# Patient Record
Sex: MALE | URBAN - METROPOLITAN AREA
[De-identification: names, ages, dates, MRNs, and addresses within clinical notes are randomized per-mention and may not be internally consistent; named-entity substitution may affect disease eponyms.]

---

## 2023-10-23 ENCOUNTER — TELEPHONE (OUTPATIENT)
Age: 73
End: 2023-10-23

## 2023-10-23 NOTE — TELEPHONE ENCOUNTER
Patient's wife called back stating she got a call from the office and she missed it. Informed her that no sooner appointment at this time. The office will await the records to be reviewed and see if sooner appointment is necessary.   She verbalized understanding and will have more medical records sent from [facility].     Patient can be reached at [phone]

## 2023-10-23 NOTE — TELEPHONE ENCOUNTER
New Patient    What is the reason for the patient’s appointment?: Issues with urination, bladder pain, testicular cyst     Patient was being seen by [facility] but would like to get a second opinion. What office location does the patient prefer?: [place]     If No, are the records showing in Epic: Patients wife will be having records faxed over to our office. Fax number was given. HISTORY:   Has the patient had any previous Urologist(s)?: [facility]     Patient is now scheduled on 11/24 with  but patients wife states she would like patient to be seen sooner. Patients pcp and urologist are sending in records for him. Please monitor for records and call patients wife back regarding sooner apt.      CB: [phone]

## 2023-10-23 NOTE — TELEPHONE ENCOUNTER
Tried calling patient to inform them we do not have any openings in [place] before November 14th unless we get a cancellation.

## 2023-11-04 PROBLEM — N44.2 TESTICULAR CYST: Status: ACTIVE | Noted: 2023-11-04

## 2023-11-04 PROBLEM — N50.82 SCROTAL PAIN: Status: ACTIVE | Noted: 2023-11-04

## 2023-11-04 PROBLEM — Z80.42 FAMILY HISTORY OF PROSTATE CANCER: Status: ACTIVE | Noted: 2023-11-04

## 2023-11-04 PROBLEM — N40.1 BENIGN PROSTATIC HYPERPLASIA WITH LOWER URINARY TRACT SYMPTOMS: Status: ACTIVE | Noted: 2023-11-04

## 2023-11-04 PROBLEM — N39.8 VOIDING DYSFUNCTION: Status: ACTIVE | Noted: 2023-11-04

## 2023-11-04 PROBLEM — M62.89 PELVIC FLOOR DYSFUNCTION: Status: ACTIVE | Noted: 2023-11-04

## 2023-11-05 NOTE — PROGRESS NOTES
UROLOGY PROGRESS NOTE         NAME: Shorty Malloy  AGE: 72 y.o. SEX: male  : 1950   MRN: [id number]    DATE: 2023  TIME: 9:07 AM    Assessment and Plan   Procedures     Impression:   1. Suprapubic pain    2. Voiding dysfunction    3. Testicular cyst    4. Pelvic floor dysfunction    5. Benign prostatic hyperplasia with lower urinary tract symptoms, symptom details unspecified    6. Scrotal pain    7. Family history of prostate cancer    8. Hydrocele, unspecified hydrocele type    9. Scrotal mass         Plan: Suspect this is neuropathic pain from probably some stretch nerve. He notes looking at his history this might have occurred during his physical therapy rehabilitation on his right knee. I do not disagree with the Abida mai about the consideration of pelvic floor relaxation therapy with the physical therapist.  However another option would be trying Elavil for the neuropathic pain to 25 mg dose. Patient elects to try Elavil 25 mg. I told him to check with her family doctor if it is okay to take with Prozac. From my standpoint it is. Please note his wife does not want him to take NSAIDs because of his history of GI upset complaints. Reevaluate in 3 months. If he is doing well we will get him on a weaning protocol or stop the medication would be an option. Told the patient if he is not feeling better after 8 weeks feel free to call      Chief Complaint   No chief complaint on file. History of Present Illness     HPI: Shorty Malloy is a 72y.o. year old male who presents with had scrotal ultrasound that showed bilateral complex hydroceles. Chronic right epididymitis possibly and mildly complex bilateral epididymal head cyst.  Also had a small cyst in the right testicle  Patient has been seen by [facility] urology there was a note recently in October that showed scrotal pain bilateral family history of prostate cancer and pelvic floor dysfunction.   He was recommended either physical therapy or Flomax he decided on Flomax. He had a long history epididymitis and orchitis and multiple ultrasounds    Currently had a PSA 2.1 in 2023. Patient here for second opinion regarding bilateral groin pain. Patient states after he had his knee operation in September he was going through physical therapy and developed this groin discomfort as well as orchialgia. He does a have occasional dysuria but no real bothersome irritable or obstructive voiding complaints and was not interested in taking Flomax at the [facility]. He states that large 8 cm complex cyst by renal ultrasound has been there for years and that is not bothersome to him. Interestingly he says the discomfort comes and goes in the groin and the PNO scrotal area. It is worse with anxiety he was recently states that Ativan helps. He also was recently started on Prozac. He also said a heating pad helps. The following portions of the patient's history were reviewed and updated as appropriate: allergies, current medications, past family history, past medical history, past social history, past surgical history and problem list.  Past Medical History:   Diagnosis Date    Anxiety     Arthritis     BPH (benign prostatic hyperplasia)     Left ankle pain     Low back pain     Pancreatic cyst     Prostate cancer screening     Pulmonary nodule     Rheumatoid arthritis ([address])      Past Surgical History:   Procedure Laterality Date    COLONOSCOPY      REPLACEMENT TOTAL KNEE       shoulder  Review of Systems     Const: Denies chills, fever and weight loss. CV: Denies chest pain. Resp: Denies SOB. GI: Denies abdominal pain, nausea and vomiting. : Denies symptoms other than stated above. Musculo: Denies back pain. Objective   There were no vitals taken for this visit. Physical Exam  Const: Appears healthy and well developed. No signs of acute distress present.   Resp: Respirations are regular and unlabored. CV: Rate is regular. Rhythm is regular. Abdomen: Abdomen is soft, nontender, and nondistended. Kidneys are not palpable. : Normal external genitalia exam except for the fact that he had that large complex cyst in the superior aspect the right hemiscrotum. Both testicles were a little tender to the touch. There were no groin hernias but he was sore in both of the inguinal area. The prostate was benign feeling and smooth slightly enlarged. Psych: Patient's attitude is cooperative. Mood is normal. Affect is normal.    Current Medications   No current outpatient medications on file.         Annie Sales MD

## 2023-11-07 PROBLEM — N43.3 HYDROCELE: Status: ACTIVE | Noted: 2023-11-07

## 2023-11-07 PROBLEM — N50.89 SCROTAL MASS: Status: ACTIVE | Noted: 2023-11-07

## 2023-11-08 ENCOUNTER — OFFICE VISIT (OUTPATIENT)
Dept: UROLOGY | Facility: CLINIC | Age: 73
End: 2023-11-08
Payer: MEDICARE

## 2023-11-08 VITALS
WEIGHT: 182.8 LBS | SYSTOLIC BLOOD PRESSURE: 140 MMHG | BODY MASS INDEX: 27.71 KG/M2 | TEMPERATURE: 98.5 F | HEART RATE: 74 BPM | DIASTOLIC BLOOD PRESSURE: 82 MMHG | HEIGHT: 68 IN | OXYGEN SATURATION: 98 %

## 2023-11-08 DIAGNOSIS — N44.2 TESTICULAR CYST: ICD-10-CM

## 2023-11-08 DIAGNOSIS — N40.1 BENIGN PROSTATIC HYPERPLASIA WITH LOWER URINARY TRACT SYMPTOMS, SYMPTOM DETAILS UNSPECIFIED: ICD-10-CM

## 2023-11-08 DIAGNOSIS — M62.89 PELVIC FLOOR DYSFUNCTION: ICD-10-CM

## 2023-11-08 DIAGNOSIS — N43.3 HYDROCELE, UNSPECIFIED HYDROCELE TYPE: ICD-10-CM

## 2023-11-08 DIAGNOSIS — N50.89 SCROTAL MASS: ICD-10-CM

## 2023-11-08 DIAGNOSIS — Z80.42 FAMILY HISTORY OF PROSTATE CANCER: ICD-10-CM

## 2023-11-08 DIAGNOSIS — N50.82 SCROTAL PAIN: ICD-10-CM

## 2023-11-08 DIAGNOSIS — R10.2 SUPRAPUBIC PAIN: Primary | ICD-10-CM

## 2023-11-08 DIAGNOSIS — N39.8 VOIDING DYSFUNCTION: ICD-10-CM

## 2023-11-08 LAB
SL AMB  POCT GLUCOSE, UA: ABNORMAL
SL AMB LEUKOCYTE ESTERASE,UA: ABNORMAL
SL AMB POCT BILIRUBIN,UA: ABNORMAL
SL AMB POCT BLOOD,UA: ABNORMAL
SL AMB POCT CLARITY,UA: CLEAR
SL AMB POCT COLOR,UA: ABNORMAL
SL AMB POCT KETONES,UA: ABNORMAL
SL AMB POCT NITRITE,UA: ABNORMAL
SL AMB POCT PH,UA: 6.5
SL AMB POCT SPECIFIC GRAVITY,UA: 1.02
SL AMB POCT URINE PROTEIN: ABNORMAL
SL AMB POCT UROBILINOGEN: 0.2

## 2023-11-08 PROCEDURE — 81003 URINALYSIS AUTO W/O SCOPE: CPT | Performed by: UROLOGY

## 2023-11-08 PROCEDURE — 99204 OFFICE O/P NEW MOD 45 MIN: CPT | Performed by: UROLOGY

## 2023-11-08 RX ORDER — OMEPRAZOLE 40 MG/1
40 CAPSULE, DELAYED RELEASE ORAL
COMMUNITY

## 2023-11-08 RX ORDER — AMITRIPTYLINE HYDROCHLORIDE 25 MG/1
25 TABLET, FILM COATED ORAL
Qty: 90 TABLET | Refills: 3 | Status: SHIPPED | OUTPATIENT
Start: 2023-11-08

## 2023-11-08 RX ORDER — FLUOXETINE HYDROCHLORIDE 20 MG/1
CAPSULE ORAL
COMMUNITY
Start: 2023-10-31

## 2023-11-08 RX ORDER — FLUTICASONE PROPIONATE 50 MCG
1 SPRAY, SUSPENSION (ML) NASAL
COMMUNITY

## 2023-11-08 RX ORDER — L. ACIDOPHILUS/L. BIFIDUS 1B CELL
WAFER ORAL
COMMUNITY

## 2023-11-08 RX ORDER — MELATONIN
4000 DAILY
COMMUNITY

## 2023-11-08 RX ORDER — METHOTREXATE 25 MG/ML
INJECTION, SOLUTION INTRA-ARTERIAL; INTRAMUSCULAR; INTRAVENOUS
COMMUNITY

## 2023-11-08 RX ORDER — UREA 10 %
800 LOTION (ML) TOPICAL
COMMUNITY

## 2023-11-08 RX ORDER — ACETAMINOPHEN 500 MG
1000 TABLET ORAL EVERY 6 HOURS PRN
COMMUNITY

## 2023-11-08 RX ORDER — MAGNESIUM OXIDE 400 MG/1
400 TABLET ORAL
COMMUNITY

## 2023-11-08 RX ORDER — INFLIXIMAB 100 MG/10ML
INJECTION, POWDER, LYOPHILIZED, FOR SOLUTION INTRAVENOUS
COMMUNITY

## 2023-11-08 RX ORDER — LORAZEPAM 0.5 MG/1
0.5 TABLET ORAL
COMMUNITY
Start: 2023-06-27

## 2023-11-08 RX ORDER — CETIRIZINE HYDROCHLORIDE 10 MG/1
10 TABLET ORAL DAILY
COMMUNITY

## 2024-03-28 ENCOUNTER — TELEPHONE (OUTPATIENT)
Dept: CARDIOLOGY CLINIC | Facility: CLINIC | Age: 74
End: 2024-03-28

## 2024-04-01 ENCOUNTER — TELEPHONE (OUTPATIENT)
Age: 74
End: 2024-04-01

## 2024-04-01 NOTE — TELEPHONE ENCOUNTER
Patient of DR. Colvin at [facility]    Patient's wife called stating she is to reschedule the appointment for 05/08/24. They were out of town and were not able to call back . She stated a message was left for 05/09/24 but appointments are no longer available.  Next available is not until September and patient would like to be seen prior to that.  Review and advise.    Patient can be reached at [phone]

## 2024-05-15 ENCOUNTER — TELEPHONE (OUTPATIENT)
Dept: UROLOGY | Facility: CLINIC | Age: 74
End: 2024-05-15

## 2024-05-15 DIAGNOSIS — N40.1 BENIGN PROSTATIC HYPERPLASIA WITH LOWER URINARY TRACT SYMPTOMS, SYMPTOM DETAILS UNSPECIFIED: Primary | ICD-10-CM

## 2024-05-15 PROBLEM — N50.812 PAIN IN BOTH TESTICLES: Status: ACTIVE | Noted: 2024-05-15

## 2024-05-15 PROBLEM — N50.811 PAIN IN BOTH TESTICLES: Status: ACTIVE | Noted: 2024-05-15

## 2024-05-15 NOTE — PROGRESS NOTES
"UROLOGY PROGRESS NOTE         NAME: Joshua Rosenthal  AGE: 74 y.o. SEX: male  : 1950   MRN: [id number]    DATE: 5/15/2024  TIME: 9:42 AM    Assessment and Plan   Procedures     Impression:   1. Pelvic floor dysfunction  2. Voiding dysfunction  3. Testicular cyst  4. Benign prostatic hyperplasia with lower urinary tract symptoms, symptom details unspecified  5. Hydrocele, unspecified hydrocele type  6. Family history of prostate cancer  7. Scrotal mass  8. Scrotal pain  9. Pain in both testicles       Plan: Patient is doing well with the Elavil for the bilateral orchialgia.  I recommend staying on Elavil for the next 6 months.  And then starting in December take it on Monday, Wednesday, and Friday for the next 2 months and if doing well stop the medication    Follow-up with me in 2025 for yearly CASEY PSA.      Chief Complaint   No chief complaint on file.    History of Present Illness     HPI: Joshua Rosenthal is a 74 y.o. year old male who presents with follow-up office visit from 2023.  Patient was having suprapubic pain, voiding dysfunction, pelvic floor dysfunction and he was going to consider physical therapy by [facility] but elected for Elavil 25 mg.    Also has a history of scrotal mass, hydrocele, family history of prostate cancer and testicular cyst.  We were to see the patient back in 3 months.    Please note, \"scrotal ultrasound 2023 right side testicle area showed a complex cyst measuring 8 cm that at the time of the office visit with me he was asymptomatic.  Also had a right testicular cyst, bilateral hydroceles, and epididymal head cyst and small varicocele.  Per [facility] radiology  Patient is here for follow-up bilateral orchialgia and response to Elavil.  Patient was to have a PSA.  Patient's PSA was 2.2 on 2024 this was discussed with he and his wife.    Currently feeling better with the Elavil for his orchialgia still has some times where he has some " discomfort but all in all happy with the results.      The following portions of the patient's history were reviewed and updated as appropriate: allergies, current medications, past family history, past medical history, past social history, past surgical history and problem list.  Past Medical History:   Diagnosis Date    Anxiety     Arthritis     BPH (benign prostatic hyperplasia)     Left ankle pain     Low back pain     Pancreatic cyst     Prostate cancer screening     Pulmonary nodule     Rheumatoid arthritis (HCC)      Past Surgical History:   Procedure Laterality Date    BASAL CELL CARCINOMA EXCISION      COLONOSCOPY      REPLACEMENT TOTAL KNEE       shoulder  Review of Systems     Const: Denies chills, fever and weight loss.  CV: Denies chest pain.  Resp: Denies SOB.  GI: Denies abdominal pain, nausea and vomiting.  : Denies symptoms other than stated above.  Musculo: Denies back pain.    Objective   There were no vitals taken for this visit.    Physical Exam  Const: Appears healthy and well developed. No signs of acute distress present.  Resp: Respirations are regular and unlabored.   CV: Rate is regular. Rhythm is regular.  Abdomen: Abdomen is soft, nontender, and nondistended. Kidneys are not palpable.  : He has right testicle nontender.  Left testicle slightly tender to touch no focal mass.  That right hemiscrotal cystic structure that it has been there for years and stable and in general not bothersome.  Psych: Patient's attitude is cooperative. Mood is normal. Affect is normal.    Current Medications     Current Outpatient Medications:     acetaminophen (TYLENOL) 500 mg tablet, Take 1,000 mg by mouth every 6 (six) hours as needed for mild pain, Disp: , Rfl:     amitriptyline (ELAVIL) 25 mg tablet, Take 1 tablet (25 mg total) by mouth daily at bedtime, Disp: 90 tablet, Rfl: 3    ascorbic acid (VITAMIN C) 1000 MG tablet, Take 1,000 mg by mouth daily, Disp: , Rfl:     cetirizine (ZyrTEC) 10 mg tablet,  Take 10 mg by mouth daily, Disp: , Rfl:     cholecalciferol (VITAMIN D3) 1,000 units tablet, Take 4,000 Units by mouth daily, Disp: , Rfl:     FLUoxetine (PROzac) 20 mg capsule, Take by mouth, Disp: , Rfl:     fluticasone (FLONASE) 50 mcg/act nasal spray, 1 spray into each nostril, Disp: , Rfl:     folic acid (FOLVITE) 800 MCG tablet, Take 800 mcg by mouth, Disp: , Rfl:     inFLIXimab (REMICADE) 100 mg, Inject into a catheter in a vein every 6 weeks, Disp: , Rfl:     Lactobacillus (Acidophilus/Bifidus) WAFR, Take by mouth, Disp: , Rfl:     LORazepam (Ativan) 0.5 mg tablet, 0.5 mg, Disp: , Rfl:     magnesium oxide (MAG-OX) 400 mg tablet, Take 400 mg by mouth, Disp: , Rfl:     methotrexate 50 MG/2ML injection, INJECT 0.8 ML EVERY WEEK DISCARD VIAL 28 DAYS FROM OPENING, Disp: , Rfl:     Multiple Vitamins-Minerals (PRESERVISION AREDS PO), Take by mouth, Disp: , Rfl:     omeprazole (PriLOSEC) 40 MG capsule, Take 40 mg by mouth, Disp: , Rfl:     Riboflavin (Vitamin B-2) 100 MG TABS, Take 400 mg by mouth, Disp: , Rfl:         Popeye Colvin MD

## 2024-05-15 NOTE — TELEPHONE ENCOUNTER
----- Message from Popeye Colvin MD sent at 5/15/2024  9:42 AM EDT -----  If possible, patient should have PSA a week prior to seeing me next week thanks

## 2024-05-15 NOTE — TELEPHONE ENCOUNTER
Spoke with pt and his wife, I will fax the psa order to PCP office. Confirmation received. Pt verbalized understanding.

## 2024-05-20 RX ORDER — PYRANTEL PAMOATE 50 MG/ML
SUSPENSION, ORAL (FINAL DOSE FORM) ORAL
COMMUNITY

## 2024-05-20 RX ORDER — THIAMINE HCL 100 MG
TABLET ORAL
COMMUNITY

## 2024-05-20 RX ORDER — CRANBERRY FRUIT EXTRACT 650 MG
CAPSULE ORAL
COMMUNITY
End: 2024-05-24 | Stop reason: ALTCHOICE

## 2024-05-20 RX ORDER — INFLIXIMAB 100 MG/10ML
INJECTION, POWDER, LYOPHILIZED, FOR SOLUTION INTRAVENOUS
COMMUNITY
End: 2024-05-24 | Stop reason: ALTCHOICE

## 2024-05-24 ENCOUNTER — OFFICE VISIT (OUTPATIENT)
Dept: UROLOGY | Facility: CLINIC | Age: 74
End: 2024-05-24
Payer: MEDICARE

## 2024-05-24 ENCOUNTER — TELEPHONE (OUTPATIENT)
Dept: UROLOGY | Facility: CLINIC | Age: 74
End: 2024-05-24

## 2024-05-24 VITALS
DIASTOLIC BLOOD PRESSURE: 92 MMHG | SYSTOLIC BLOOD PRESSURE: 158 MMHG | HEIGHT: 68 IN | BODY MASS INDEX: 27.68 KG/M2 | OXYGEN SATURATION: 97 % | TEMPERATURE: 97.6 F | WEIGHT: 182.6 LBS

## 2024-05-24 DIAGNOSIS — N50.811 PAIN IN BOTH TESTICLES: ICD-10-CM

## 2024-05-24 DIAGNOSIS — Z80.42 FAMILY HISTORY OF PROSTATE CANCER: ICD-10-CM

## 2024-05-24 DIAGNOSIS — N50.82 SCROTAL PAIN: ICD-10-CM

## 2024-05-24 DIAGNOSIS — N43.3 HYDROCELE, UNSPECIFIED HYDROCELE TYPE: ICD-10-CM

## 2024-05-24 DIAGNOSIS — N50.89 SCROTAL MASS: ICD-10-CM

## 2024-05-24 DIAGNOSIS — N44.2 TESTICULAR CYST: ICD-10-CM

## 2024-05-24 DIAGNOSIS — N40.1 BENIGN PROSTATIC HYPERPLASIA WITH LOWER URINARY TRACT SYMPTOMS, SYMPTOM DETAILS UNSPECIFIED: ICD-10-CM

## 2024-05-24 DIAGNOSIS — N50.812 PAIN IN BOTH TESTICLES: ICD-10-CM

## 2024-05-24 DIAGNOSIS — R35.1 NOCTURIA: ICD-10-CM

## 2024-05-24 DIAGNOSIS — M62.89 PELVIC FLOOR DYSFUNCTION: Primary | ICD-10-CM

## 2024-05-24 DIAGNOSIS — N39.8 VOIDING DYSFUNCTION: ICD-10-CM

## 2024-05-24 LAB
SL AMB  POCT GLUCOSE, UA: ABNORMAL
SL AMB LEUKOCYTE ESTERASE,UA: ABNORMAL
SL AMB POCT BILIRUBIN,UA: ABNORMAL
SL AMB POCT BLOOD,UA: ABNORMAL
SL AMB POCT CLARITY,UA: CLEAR
SL AMB POCT COLOR,UA: ABNORMAL
SL AMB POCT KETONES,UA: ABNORMAL
SL AMB POCT NITRITE,UA: ABNORMAL
SL AMB POCT PH,UA: 5.5
SL AMB POCT SPECIFIC GRAVITY,UA: 1.02
SL AMB POCT URINE PROTEIN: ABNORMAL
SL AMB POCT UROBILINOGEN: 0.2

## 2024-05-24 PROCEDURE — 81003 URINALYSIS AUTO W/O SCOPE: CPT | Performed by: UROLOGY

## 2024-05-24 PROCEDURE — 99214 OFFICE O/P EST MOD 30 MIN: CPT | Performed by: UROLOGY

## 2024-05-24 RX ORDER — AMOXICILLIN 500 MG/1
TABLET, FILM COATED ORAL
COMMUNITY
Start: 2024-04-26

## 2024-05-24 NOTE — TELEPHONE ENCOUNTER
----- Message from Popeye Colvin MD sent at 5/24/2024  9:37 AM EDT -----  Could you please let the wife know Yamilex, that my apologies I forgot to tell them about the Prelief on the discharge summary they can get that over-the-counter or on [employer] to help reduce the burning.  I recommend taking it once or twice a day.  Please let them know I apologize for forgetting to put that on the discharge summary thanks

## 2024-05-24 NOTE — TELEPHONE ENCOUNTER
Spoke with Yamilex, she is aware of Dr. Sage recommendations regarding prelief. I will mail them a list of foods to avoid and to eat to help decrease the burning and discomfort.  Info mailed as requested.

## 2024-05-24 NOTE — PATIENT INSTRUCTIONS
Please continue Elavil 25 mg at nighttime every day until December 1.  And then for the next 2 months please take the medication on Monday, Wednesday, Friday and do not take the medicine on Tuesday, Thursday, and weekends.  If you noticed that your symptoms are doing well whether you are on or off the medication then you can stop it.

## 2024-08-13 ENCOUNTER — TELEPHONE (OUTPATIENT)
Dept: UROLOGY | Facility: CLINIC | Age: 74
End: 2024-08-13

## 2024-08-13 NOTE — TELEPHONE ENCOUNTER
Left message for patient to call back and schedule a follow up appointment with Dr. Colvin in February of 2025.        Follow-up with me in February 2025 for yearly CASEY PSA. Per Dr. Colvin

## 2024-08-13 NOTE — TELEPHONE ENCOUNTER
Patient's spouse Yamilex returned call and scheduled the patient for OV on 2/13 at 2:15 PM with Dr. Colvin.    No further action needed at this time.

## 2024-12-02 ENCOUNTER — TELEPHONE (OUTPATIENT)
Age: 74
End: 2024-12-02

## 2024-12-02 NOTE — TELEPHONE ENCOUNTER
Patients wife, Yamilex, called asking about his elavil tapering and his PSA blood work.   She requesting I faxed the patients PSA order to his PCP. I reviewed the information from Dr. Sage last office note. I advised the complete the PSA 2 weeks prior to his appointment on 2/13/2025. Not currently taking Remicade and is currently taking simponi aria.    Fax #: [phone]    I recommend staying on Elavil for the next 6 months. And then starting in December take it on Monday, Wednesday, and Friday for the next 2 months and if doing well stop the medication

## 2025-01-28 RX ORDER — ESCITALOPRAM OXALATE 5 MG/1
TABLET ORAL
COMMUNITY
Start: 2024-12-16

## 2025-02-04 NOTE — PROGRESS NOTES
UROLOGY PROGRESS NOTE         NAME: Joshua Rosenthal  AGE: 74 y.o. SEX: male  : 1950   MRN: [id number]    DATE: 2025  TIME: 6:44 AM    Assessment and Plan   Procedures     Impression:   1. Pelvic floor dysfunction  2. Testicular cyst  3. Benign prostatic hyperplasia with lower urinary tract symptoms, symptom details unspecified  4. Hydrocele, unspecified hydrocele type  5. Family history of prostate cancer  6. Pain in both testicles  7. Scrotal pain  8. Voiding dysfunction       Plan: Will give patient YouTube video recommendations for male pelvic floor dysfunction and exercises.  He will continue the Elavil  over the next month or 2 and if he is doing as well and is off for Sundays he will stop the medication and let us know if there is a concern or problem.  Otherwise we will see the patient back in 1 year with a repeat exam.  Patient agrees he will have a CASEY PSA      Chief Complaint   No chief complaint on file.    History of Present Illness     HPI: Joshua Rosenthal is a 74 y.o. year old male who presents with follow-up office visit from 2024.  Patient with voiding dysfunction, pelvic floor dysfunction, BPH, hydrocele, testicular cyst, family history of prostate cancer, scrotal pain and bilateral orchialgia.  At the time of the last office visit patient was doing well with the Elavil I recommended staying on it the next 6 months.    I did state that in 2024 he should take it on  and Friday for the next 2 months if he is doing well then stop the medicine and he was to follow-up with me in 2025 for yearly CASEY PSA.    Last year's PSA on 2024 was 2.2.  His PSA on 2025 was 3.0.    Patient PSA 2025 was 3.0.    Currently patient doing well.  He just started doing the Elavil  has been tried for a month or so and if he is doing well he will stop the medication.  All in all happy the way things are working.  He is  not having any irritative or obstructive voiding complaints.  The right hydrocele does not bother him.  We discussed his PSA results that were good.    He occasionally still has some prostate pain from his pelvic floor dysfunction and he did rather try YouTube videos with pelvic floor relaxation techniques then go to physical therapy at this time.    Not having any erectile dysfunction issues.      The following portions of the patient's history were reviewed and updated as appropriate: allergies, current medications, past family history, past medical history, past social history, past surgical history and problem list.  Past Medical History:   Diagnosis Date    Anxiety     Arthritis     BPH (benign prostatic hyperplasia)     Left ankle pain     Low back pain     Pancreatic cyst     Prostate cancer screening     Pulmonary nodule     Rheumatoid arthritis (HCC)      Past Surgical History:   Procedure Laterality Date    BASAL CELL CARCINOMA EXCISION      COLONOSCOPY      REPLACEMENT TOTAL KNEE Right      shoulder  Review of Systems     Const: Denies chills, fever and weight loss.  CV: Denies chest pain.  Resp: Denies SOB.  GI: Denies abdominal pain, nausea and vomiting.  : Denies symptoms other than stated above.  Musculo: Denies back pain.    Objective   There were no vitals taken for this visit.    Physical Exam  Const: Appears healthy and well developed. No signs of acute distress present.  Resp: Respirations are regular and unlabored.   CV: Rate is regular. Rhythm is regular.  Abdomen: Abdomen is soft, nontender, and nondistended. Kidneys are not palpable.  : He had a normal external genitalia exam course the right hydrocele was there stable nonbothersome testicular exam normal minimal pain bladder was nontender nondistended and he had stable prostate exam right greater then left asymmetry not suspicious smooth slightly enlarged.  Psych: Patient's attitude is cooperative. Mood is normal. Affect is  normal.    Current Medications     Current Outpatient Medications:     Abatacept (ORENCIA IV), Inject into a catheter in a vein, Disp: , Rfl:     acetaminophen (TYLENOL) 500 mg tablet, Take 1,000 mg by mouth every 6 (six) hours as needed for mild pain, Disp: , Rfl:     amitriptyline (ELAVIL) 25 mg tablet, Take 1 tablet (25 mg total) by mouth daily at bedtime, Disp: 90 tablet, Rfl: 3    amoxicillin (AMOXIL) 500 MG tablet, TAKE 4 TABLETS BY MOUTH 30 TO 60 MINUTES BEFORE DENTAL PROCEDURE, Disp: , Rfl:     ascorbic acid (VITAMIN C) 1000 MG tablet, Take 1,000 mg by mouth daily, Disp: , Rfl:     cetirizine (ZyrTEC) 10 mg tablet, Take 10 mg by mouth daily, Disp: , Rfl:     cholecalciferol (VITAMIN D3) 1,000 units tablet, Take 4,000 Units by mouth daily, Disp: , Rfl:     Cyanocobalamin ER ([employer] Vitamin B12) 1000 MCG TBCR, Take by mouth, Disp: , Rfl:     escitalopram (LEXAPRO) 5 mg tablet, Take by mouth, Disp: , Rfl:     fluticasone (FLONASE) 50 mcg/act nasal spray, 1 spray into each nostril, Disp: , Rfl:     folic acid (FOLVITE) 800 MCG tablet, Take 800 mcg by mouth, Disp: , Rfl:     Lactobacillus (Acidophilus/Bifidus) WAFR, Take by mouth, Disp: , Rfl:     Magnesium 500 MG TABS, Take by mouth, Disp: , Rfl:     methotrexate 50 MG/2ML injection, INJECT 0.8 ML EVERY WEEK DISCARD VIAL 28 DAYS FROM OPENING, Disp: , Rfl:     Multiple Vitamins-Minerals (PRESERVISION AREDS PO), Take by mouth, Disp: , Rfl:     omeprazole (PriLOSEC) 40 MG capsule, Take 40 mg by mouth, Disp: , Rfl:     Riboflavin (Vitamin B-2) 100 MG TABS, Take 400 mg by mouth, Disp: , Rfl:         Popeye Colvin MD

## 2025-02-13 ENCOUNTER — OFFICE VISIT (OUTPATIENT)
Dept: UROLOGY | Facility: CLINIC | Age: 75
End: 2025-02-13
Payer: MEDICARE

## 2025-02-13 VITALS
WEIGHT: 182 LBS | HEART RATE: 94 BPM | TEMPERATURE: 97.2 F | SYSTOLIC BLOOD PRESSURE: 116 MMHG | BODY MASS INDEX: 27.58 KG/M2 | DIASTOLIC BLOOD PRESSURE: 78 MMHG | OXYGEN SATURATION: 97 % | HEIGHT: 68 IN

## 2025-02-13 DIAGNOSIS — N44.2 TESTICULAR CYST: ICD-10-CM

## 2025-02-13 DIAGNOSIS — N50.82 SCROTAL PAIN: ICD-10-CM

## 2025-02-13 DIAGNOSIS — N50.811 PAIN IN BOTH TESTICLES: ICD-10-CM

## 2025-02-13 DIAGNOSIS — N50.812 PAIN IN BOTH TESTICLES: ICD-10-CM

## 2025-02-13 DIAGNOSIS — N43.3 HYDROCELE, UNSPECIFIED HYDROCELE TYPE: ICD-10-CM

## 2025-02-13 DIAGNOSIS — N39.8 VOIDING DYSFUNCTION: ICD-10-CM

## 2025-02-13 DIAGNOSIS — R35.1 NOCTURIA: ICD-10-CM

## 2025-02-13 DIAGNOSIS — M62.89 PELVIC FLOOR DYSFUNCTION: Primary | ICD-10-CM

## 2025-02-13 DIAGNOSIS — Z80.42 FAMILY HISTORY OF PROSTATE CANCER: ICD-10-CM

## 2025-02-13 DIAGNOSIS — N40.1 BENIGN PROSTATIC HYPERPLASIA WITH LOWER URINARY TRACT SYMPTOMS, SYMPTOM DETAILS UNSPECIFIED: ICD-10-CM

## 2025-02-13 PROCEDURE — 99214 OFFICE O/P EST MOD 30 MIN: CPT | Performed by: UROLOGY

## 2025-02-13 RX ORDER — GOLIMUMAB 50 MG/4ML
50 SOLUTION INTRAVENOUS
COMMUNITY

## 2025-02-13 RX ORDER — PREDNISONE 5 MG/1
5 TABLET ORAL DAILY
COMMUNITY
Start: 2025-02-13

## 2025-02-13 NOTE — PATIENT INSTRUCTIONS
Please go on YouTube and [employer] Male  pelvic floor dysfunction exercises    Please continue Elavil Monday Wednesday Friday over the next 6 to 8 weeks and if you notice your on versus off days are the same okay to stop the Elavil if you feel you need to take it still okay to continue and just let Dr. Colvin aware.  Send him a note.  Thanks